# Patient Record
(demographics unavailable — no encounter records)

---

## 2025-03-19 NOTE — HISTORY OF PRESENT ILLNESS
[FreeTextEntry1] : here for mng, hx of gdm, androgenic alopecia  interval history feels okay  no labs or sono  reviewed ultrasound 2024- reviewed images, slight increase in volume about 50% volume  regimen minoxidil oral, hair loss stopped mvi still on spironolactone for hair loss from dermatology but possibly being titrated off

## 2025-03-19 NOTE — ASSESSMENT
[FreeTextEntry1] : 44F w/ androgenic alopecia, known MNG and history of GDM 2013 (diet controlled) here for follow up. rtc in 1 year sono and labs pending  MNG- stable 3 nodules and the newer nodule noted by [facility] slightly larger (but allowing for differences in technique, probably about the same). images reviewed.  10/4/21 RMP 1.1cm with AUS but neg thyroseq, which increased in size about 50% in 3 years (when compared with only [facility] measurements), if continues to enlarge, then will reconsider rebiopsing again discussed thyroid cancer and thyroid nodules at length in the past again discussed neck surgery for which she does not want to undergo due to ptsd in the past repeat sono now  History of GDM- CLARIBEL/islet cell Ab negative with elevated Cpeptide, though patient does not have T1DM at this point, still a concern for T1DM given FH of autoimmune disease in the family. check FS occ or if not feeling well.. call if sugars>300s. has accu-chek guide meter. screen with A1c annually  Acne- improved on spironolactone. unlikely to have PCOS as she does not meet any criteria. repeat labs in 2023 does not show elevated testosterone or dheas.

## 2025-03-19 NOTE — DATA REVIEWED
[FreeTextEntry1] : [facility] thyroid ultrasound: images reviewed:\par  1.1x0.7x0.8cm isoechoic solid mid to upper pole posterior nodule- not very convincing, no need for biopsy now\par  RMP 0.5cm complex nodule and Rmid to lower pole 0.4cm solid heterogenous nodule (appears more like heterogeneity rather than actual nodule)

## 2025-03-19 NOTE — PHYSICAL EXAM
[Alert] : alert [Well Nourished] : well nourished [No Acute Distress] : no acute distress [Well Developed] : well developed [Normal Sclera/Conjunctiva] : normal sclera/conjunctiva [EOMI] : extra ocular movement intact [No Proptosis] : no proptosis [Normal Oropharynx] : the oropharynx was normal [Thyroid Not Enlarged] : the thyroid was not enlarged [No Thyroid Nodules] : no palpable thyroid nodules [No Respiratory Distress] : no respiratory distress [No Accessory Muscle Use] : no accessory muscle use [Clear to Auscultation] : lungs were clear to auscultation bilaterally [Normal S1, S2] : normal S1 and S2 [Normal Rate] : heart rate was normal [Regular Rhythm] : with a regular rhythm [Normal Bowel Sounds] : normal bowel sounds [Not Tender] : non-tender [Not Distended] : not distended [Soft] : abdomen soft [No Stigmata of Cushings Syndrome] : no stigmata of Cushings Syndrome [Normal Gait] : normal gait [Normal Strength/Tone] : muscle strength and tone were normal [No Rash] : no rash [Normal Reflexes] : deep tendon reflexes were 2+ and symmetric [No Tremors] : no tremors [Oriented x3] : oriented to person, place, and time [Normal Affect] : the affect was normal [Normal Mood] : the mood was normal [Acanthosis Nigricans] : no acanthosis nigricans [de-identified] : thin [de-identified] : palpable cervical LN 1cm b/l at the angle of the mandible